# Patient Record
Sex: MALE | Race: OTHER | NOT HISPANIC OR LATINO | Employment: FULL TIME | URBAN - NONMETROPOLITAN AREA
[De-identification: names, ages, dates, MRNs, and addresses within clinical notes are randomized per-mention and may not be internally consistent; named-entity substitution may affect disease eponyms.]

---

## 2022-04-01 ENCOUNTER — LAB VISIT (OUTPATIENT)
Dept: LAB | Facility: HOSPITAL | Age: 47
End: 2022-04-01
Attending: STUDENT IN AN ORGANIZED HEALTH CARE EDUCATION/TRAINING PROGRAM
Payer: MEDICAID

## 2022-04-01 ENCOUNTER — OFFICE VISIT (OUTPATIENT)
Dept: PRIMARY CARE CLINIC | Facility: CLINIC | Age: 47
End: 2022-04-01
Payer: MEDICAID

## 2022-04-01 VITALS
BODY MASS INDEX: 28.76 KG/M2 | HEART RATE: 71 BPM | DIASTOLIC BLOOD PRESSURE: 71 MMHG | SYSTOLIC BLOOD PRESSURE: 119 MMHG | OXYGEN SATURATION: 97 % | TEMPERATURE: 98 F | HEIGHT: 65 IN | WEIGHT: 172.63 LBS

## 2022-04-01 DIAGNOSIS — Z13.0 SCREENING FOR DEFICIENCY ANEMIA: ICD-10-CM

## 2022-04-01 DIAGNOSIS — Z13.220 ENCOUNTER FOR LIPID SCREENING FOR CARDIOVASCULAR DISEASE: ICD-10-CM

## 2022-04-01 DIAGNOSIS — Z13.6 ENCOUNTER FOR LIPID SCREENING FOR CARDIOVASCULAR DISEASE: ICD-10-CM

## 2022-04-01 DIAGNOSIS — H53.8 BLURRED VISION, LEFT EYE: Primary | ICD-10-CM

## 2022-04-01 LAB
ALBUMIN SERPL BCP-MCNC: 4.1 G/DL (ref 3.5–5.2)
ALP SERPL-CCNC: 83 U/L (ref 55–135)
ALT SERPL W/O P-5'-P-CCNC: 67 U/L (ref 10–44)
ANION GAP SERPL CALC-SCNC: 4 MMOL/L (ref 8–16)
AST SERPL-CCNC: 29 U/L (ref 10–40)
BASOPHILS # BLD AUTO: 0.04 K/UL (ref 0–0.2)
BASOPHILS NFR BLD: 0.6 % (ref 0–1.9)
BILIRUB SERPL-MCNC: 0.3 MG/DL (ref 0.1–1)
BUN SERPL-MCNC: 16 MG/DL (ref 6–20)
CALCIUM SERPL-MCNC: 9.1 MG/DL (ref 8.7–10.5)
CHLORIDE SERPL-SCNC: 104 MMOL/L (ref 95–110)
CHOLEST SERPL-MCNC: 287 MG/DL (ref 120–199)
CHOLEST/HDLC SERPL: 7.6 {RATIO} (ref 2–5)
CO2 SERPL-SCNC: 30 MMOL/L (ref 23–29)
CREAT SERPL-MCNC: 1 MG/DL (ref 0.5–1.4)
DIFFERENTIAL METHOD: NORMAL
EOSINOPHIL # BLD AUTO: 0.1 K/UL (ref 0–0.5)
EOSINOPHIL NFR BLD: 1.9 % (ref 0–8)
ERYTHROCYTE [DISTWIDTH] IN BLOOD BY AUTOMATED COUNT: 14.1 % (ref 11.5–14.5)
EST. GFR  (AFRICAN AMERICAN): >60 ML/MIN/1.73 M^2
EST. GFR  (NON AFRICAN AMERICAN): >60 ML/MIN/1.73 M^2
GLUCOSE SERPL-MCNC: 104 MG/DL (ref 70–110)
HCT VFR BLD AUTO: 45.9 % (ref 40–54)
HDLC SERPL-MCNC: 38 MG/DL (ref 40–75)
HDLC SERPL: 13.2 % (ref 20–50)
HGB BLD-MCNC: 15.1 G/DL (ref 14–18)
IMM GRANULOCYTES # BLD AUTO: 0.02 K/UL (ref 0–0.04)
IMM GRANULOCYTES NFR BLD AUTO: 0.3 % (ref 0–0.5)
LDLC SERPL CALC-MCNC: ABNORMAL MG/DL (ref 63–159)
LYMPHOCYTES # BLD AUTO: 2.9 K/UL (ref 1–4.8)
LYMPHOCYTES NFR BLD: 40.7 % (ref 18–48)
MCH RBC QN AUTO: 27.1 PG (ref 27–31)
MCHC RBC AUTO-ENTMCNC: 32.9 G/DL (ref 32–36)
MCV RBC AUTO: 82 FL (ref 82–98)
MONOCYTES # BLD AUTO: 0.6 K/UL (ref 0.3–1)
MONOCYTES NFR BLD: 8.3 % (ref 4–15)
NEUTROPHILS # BLD AUTO: 3.5 K/UL (ref 1.8–7.7)
NEUTROPHILS NFR BLD: 48.2 % (ref 38–73)
NONHDLC SERPL-MCNC: 249 MG/DL
NRBC BLD-RTO: 0 /100 WBC
PLATELET # BLD AUTO: 211 K/UL (ref 150–450)
PMV BLD AUTO: 10.5 FL (ref 9.2–12.9)
POTASSIUM SERPL-SCNC: 3.9 MMOL/L (ref 3.5–5.1)
PROT SERPL-MCNC: 7.8 G/DL (ref 6–8.4)
RBC # BLD AUTO: 5.57 M/UL (ref 4.6–6.2)
SODIUM SERPL-SCNC: 138 MMOL/L (ref 136–145)
TRIGL SERPL-MCNC: 457 MG/DL (ref 30–150)
WBC # BLD AUTO: 7.23 K/UL (ref 3.9–12.7)

## 2022-04-01 PROCEDURE — 3008F PR BODY MASS INDEX (BMI) DOCUMENTED: ICD-10-PCS | Mod: CPTII,,, | Performed by: STUDENT IN AN ORGANIZED HEALTH CARE EDUCATION/TRAINING PROGRAM

## 2022-04-01 PROCEDURE — 36415 COLL VENOUS BLD VENIPUNCTURE: CPT | Performed by: STUDENT IN AN ORGANIZED HEALTH CARE EDUCATION/TRAINING PROGRAM

## 2022-04-01 PROCEDURE — 3008F BODY MASS INDEX DOCD: CPT | Mod: CPTII,,, | Performed by: STUDENT IN AN ORGANIZED HEALTH CARE EDUCATION/TRAINING PROGRAM

## 2022-04-01 PROCEDURE — 3078F DIAST BP <80 MM HG: CPT | Mod: CPTII,,, | Performed by: STUDENT IN AN ORGANIZED HEALTH CARE EDUCATION/TRAINING PROGRAM

## 2022-04-01 PROCEDURE — 99203 OFFICE O/P NEW LOW 30 MIN: CPT | Mod: PBBFAC,PN | Performed by: STUDENT IN AN ORGANIZED HEALTH CARE EDUCATION/TRAINING PROGRAM

## 2022-04-01 PROCEDURE — 1159F PR MEDICATION LIST DOCUMENTED IN MEDICAL RECORD: ICD-10-PCS | Mod: CPTII,,, | Performed by: STUDENT IN AN ORGANIZED HEALTH CARE EDUCATION/TRAINING PROGRAM

## 2022-04-01 PROCEDURE — 99203 PR OFFICE/OUTPT VISIT, NEW, LEVL III, 30-44 MIN: ICD-10-PCS | Mod: S$PBB,,, | Performed by: STUDENT IN AN ORGANIZED HEALTH CARE EDUCATION/TRAINING PROGRAM

## 2022-04-01 PROCEDURE — 80053 COMPREHEN METABOLIC PANEL: CPT | Performed by: STUDENT IN AN ORGANIZED HEALTH CARE EDUCATION/TRAINING PROGRAM

## 2022-04-01 PROCEDURE — 99203 OFFICE O/P NEW LOW 30 MIN: CPT | Mod: S$PBB,,, | Performed by: STUDENT IN AN ORGANIZED HEALTH CARE EDUCATION/TRAINING PROGRAM

## 2022-04-01 PROCEDURE — 3074F PR MOST RECENT SYSTOLIC BLOOD PRESSURE < 130 MM HG: ICD-10-PCS | Mod: CPTII,,, | Performed by: STUDENT IN AN ORGANIZED HEALTH CARE EDUCATION/TRAINING PROGRAM

## 2022-04-01 PROCEDURE — 99999 PR PBB SHADOW E&M-NEW PATIENT-LVL III: CPT | Mod: PBBFAC,,, | Performed by: STUDENT IN AN ORGANIZED HEALTH CARE EDUCATION/TRAINING PROGRAM

## 2022-04-01 PROCEDURE — 99999 PR PBB SHADOW E&M-NEW PATIENT-LVL III: ICD-10-PCS | Mod: PBBFAC,,, | Performed by: STUDENT IN AN ORGANIZED HEALTH CARE EDUCATION/TRAINING PROGRAM

## 2022-04-01 PROCEDURE — 80061 LIPID PANEL: CPT | Performed by: STUDENT IN AN ORGANIZED HEALTH CARE EDUCATION/TRAINING PROGRAM

## 2022-04-01 PROCEDURE — 85025 COMPLETE CBC W/AUTO DIFF WBC: CPT | Performed by: STUDENT IN AN ORGANIZED HEALTH CARE EDUCATION/TRAINING PROGRAM

## 2022-04-01 PROCEDURE — 3078F PR MOST RECENT DIASTOLIC BLOOD PRESSURE < 80 MM HG: ICD-10-PCS | Mod: CPTII,,, | Performed by: STUDENT IN AN ORGANIZED HEALTH CARE EDUCATION/TRAINING PROGRAM

## 2022-04-01 PROCEDURE — 1159F MED LIST DOCD IN RCRD: CPT | Mod: CPTII,,, | Performed by: STUDENT IN AN ORGANIZED HEALTH CARE EDUCATION/TRAINING PROGRAM

## 2022-04-01 PROCEDURE — 3074F SYST BP LT 130 MM HG: CPT | Mod: CPTII,,, | Performed by: STUDENT IN AN ORGANIZED HEALTH CARE EDUCATION/TRAINING PROGRAM

## 2022-04-02 PROBLEM — H53.8 BLURRED VISION, LEFT EYE: Status: ACTIVE | Noted: 2022-04-02

## 2022-04-02 PROBLEM — Z13.6 ENCOUNTER FOR LIPID SCREENING FOR CARDIOVASCULAR DISEASE: Status: ACTIVE | Noted: 2022-04-02

## 2022-04-02 PROBLEM — Z13.220 ENCOUNTER FOR LIPID SCREENING FOR CARDIOVASCULAR DISEASE: Status: ACTIVE | Noted: 2022-04-02

## 2022-04-02 PROBLEM — Z13.0 SCREENING FOR DEFICIENCY ANEMIA: Status: ACTIVE | Noted: 2022-04-02

## 2022-04-02 NOTE — ASSESSMENT & PLAN NOTE
- stay physically active  - maintain daily adequate hydration 1.5 to 2L fluid volume  - as tolerated incorporate resistance training with stretching and cardio  - goal of 150 minutes per week of moderate intensity activity or 7,500 - 10,000 steps per day  - follow the Mediterranean diet, avoid low saturated fat diet  - include whole fresh fruits, vegetables, olive oil, seeds, nuts, whole grains, cold water fish, salmon, mackerel and lean cuts of meat    - do not drink sugary/diet carbonated beverages  - decrease portion sizes slightly which will result in an approximately 500-calorie deficit  - avoid fast or fried and processed food, especially canned foods   - avoid refined carbohydrates, white starchy foods, flour, white potato, bread, muffins, and cakes

## 2022-04-02 NOTE — PROGRESS NOTES
[facility] Note    Chief Complaint      Chief Complaint   Patient presents with    Establish Care    Blurred Vision       History of Present Illness      Ghislaine Arrington is a 46 y.o. male who presents today for Establish Care and Blurred Vision  Native [place]ian speaker, patient presents with interpretor Gertrudis.   Patient has noticed blurred vision affecting the left eye. He reports difficulty adjusting after bright light shines into his eyes. Over the years, he had noticed difficulty reading near objects.   Other associated symptoms include headache which resolves with one tablet Tylenol. Headaches occur once a month.   Denies fever, chills, excessive headaches, dizziness, chest pain, palpitations, shortness of breath, abdominal pain, nausea, vomiting,GI/ complaints.   Patient has been boosted for COVID immunization.   Physical exam with reduced visual acuity, will refer to optometry.     Past Medical History:  History reviewed. No pertinent past medical history.    Past Surgical History:   has no past surgical history on file.    Family History:  family history is not on file.     Social History:  Social History     Tobacco Use    Smoking status: Never Smoker    Smokeless tobacco: Never Used   Substance Use Topics    Alcohol use: Not Currently     Comment: occasionally    Drug use: Never     I personally reviewed all past medical, surgical, social and family history.    Review of Systems   Constitutional: Negative for chills, fever, malaise/fatigue and weight loss.   HENT: Negative for congestion, ear discharge, ear pain, sinus pain and sore throat.    Eyes: Negative for blurred vision, pain, discharge and redness.   Respiratory: Negative for cough, hemoptysis, sputum production, shortness of breath, wheezing and stridor.    Cardiovascular: Negative for chest pain, palpitations and leg swelling.   Gastrointestinal: Negative for abdominal pain, blood in stool, constipation, diarrhea, nausea and  "vomiting.   Genitourinary: Negative for dysuria, frequency and hematuria.   Musculoskeletal: Negative for back pain, falls, joint pain, myalgias and neck pain.   Skin: Negative.  Negative for rash.   Neurological: Negative for dizziness, tingling, focal weakness, seizures, weakness and headaches.   Endo/Heme/Allergies: Negative for environmental allergies and polydipsia. Does not bruise/bleed easily.   Psychiatric/Behavioral: Negative for depression and suicidal ideas. The patient is not nervous/anxious.       Medications:  No outpatient encounter medications on file as of 4/1/2022.     No facility-administered encounter medications on file as of 4/1/2022.     Allergies:  Review of patient's allergies indicates:  No Known Allergies    Health Maintenance:    There is no immunization history on file for this patient.   Health Maintenance   Topic Date Due    Hepatitis C Screening  Never done    TETANUS VACCINE  Never done    Lipid Panel  04/01/2027     Health Maintenance Topics with due status: Not Due       Topic Last Completion Date    Lipid Panel 04/01/2022     Health Maintenance Due   Topic Date Due    Hepatitis C Screening  Never done    COVID-19 Vaccine (1) Never done    HIV Screening  Never done    TETANUS VACCINE  Never done    Influenza Vaccine (1) Never done     Physical Exam      Vital Signs  Temp: 97.5 °F (36.4 °C)  Temp src: Oral  Pulse: 71  SpO2: 97 %  BP: 119/71  BP Location: Right arm  Patient Position: Sitting  Height and Weight  Height: 5' 5" (165.1 cm)  Weight: 78.3 kg (172 lb 9.9 oz)  BSA (Calculated - sq m): 1.89 sq meters  BMI (Calculated): 28.7  Weight in (lb) to have BMI = 25: 149.9]    Physical Exam  Vitals and nursing note reviewed.   Constitutional:       General: He is not in acute distress.     Appearance: Normal appearance. He is not toxic-appearing.   HENT:      Head: Normocephalic and atraumatic.      Right Ear: Tympanic membrane, ear canal and external ear normal.      Left Ear: " Tympanic membrane, ear canal and external ear normal.      Nose: Nose normal.      Mouth/Throat:      Mouth: Mucous membranes are moist.      Pharynx: Oropharynx is clear.   Eyes:      Extraocular Movements: Extraocular movements intact.      Conjunctiva/sclera: Conjunctivae normal.      Pupils: Pupils are equal, round, and reactive to light.   Cardiovascular:      Rate and Rhythm: Normal rate and regular rhythm.      Pulses: Normal pulses.      Heart sounds: Normal heart sounds. No murmur heard.  Pulmonary:      Effort: Pulmonary effort is normal. No respiratory distress.      Breath sounds: Normal breath sounds. No wheezing or rales.   Abdominal:      General: Abdomen is flat. Bowel sounds are normal. There is no distension.      Palpations: Abdomen is soft. There is no mass.      Tenderness: There is no abdominal tenderness. There is no right CVA tenderness or left CVA tenderness.   Musculoskeletal:         General: Normal range of motion.      Cervical back: Normal range of motion and neck supple. No rigidity.   Skin:     General: Skin is warm and dry.      Capillary Refill: Capillary refill takes less than 2 seconds.   Neurological:      General: No focal deficit present.      Mental Status: He is alert and oriented to person, place, and time.      Cranial Nerves: No cranial nerve deficit.      Sensory: No sensory deficit.      Motor: No weakness.      Coordination: Coordination normal.      Gait: Gait normal.   Psychiatric:         Mood and Affect: Mood normal.         Behavior: Behavior normal.         Thought Content: Thought content normal.         Judgment: Judgment normal.        Assessment/Plan     Ghislaine Arrington is a 46 y.o.male with:    Problem List Items Addressed This Visit        Ophtho    Blurred vision, left eye - Primary    Relevant Orders    Ambulatory referral/consult to Optometry       Cardiac/Vascular    Encounter for lipid screening for cardiovascular disease    Relevant Orders    CBC Auto  Differential (Completed)    Lipid Panel (Completed)    Comprehensive Metabolic Panel (Completed)       Oncology    Screening for deficiency anemia    Relevant Orders    CBC Auto Differential (Completed)    Comprehensive Metabolic Panel (Completed)       Endocrine    BMI 28.0-28.9,adult    Current Assessment & Plan     - stay physically active  - maintain daily adequate hydration 1.5 to 2L fluid volume  - as tolerated incorporate resistance training with stretching and cardio  - goal of 150 minutes per week of moderate intensity activity or 7,500 - 10,000 steps per day  - follow the Mediterranean diet, avoid low saturated fat diet  - include whole fresh fruits, vegetables, olive oil, seeds, nuts, whole grains, cold water fish, salmon, mackerel and lean cuts of meat    - do not drink sugary/diet carbonated beverages  - decrease portion sizes slightly which will result in an approximately 500-calorie deficit  - avoid fast or fried and processed food, especially canned foods   - avoid refined carbohydrates, white starchy foods, flour, white potato, bread, muffins, and cakes                 Other than changes above, cont current medications and maintain follow up with specialists.  Follow up in about 1 week (around 4/8/2022).    Future Appointments   Date Time Provider Department Center   4/8/2022  2:30 PM Cyndee Torrez DO [facility]       Kazumi G Yoshinaga, DO [facility]

## 2022-04-08 ENCOUNTER — OFFICE VISIT (OUTPATIENT)
Dept: PRIMARY CARE CLINIC | Facility: CLINIC | Age: 47
End: 2022-04-08
Payer: MEDICAID

## 2022-04-08 VITALS
OXYGEN SATURATION: 94 % | HEART RATE: 95 BPM | SYSTOLIC BLOOD PRESSURE: 120 MMHG | BODY MASS INDEX: 29.2 KG/M2 | TEMPERATURE: 98 F | HEIGHT: 65 IN | DIASTOLIC BLOOD PRESSURE: 73 MMHG | WEIGHT: 175.25 LBS

## 2022-04-08 DIAGNOSIS — E78.1 HYPERTRIGLYCERIDEMIA: ICD-10-CM

## 2022-04-08 DIAGNOSIS — Z13.0 SCREENING FOR DEFICIENCY ANEMIA: ICD-10-CM

## 2022-04-08 DIAGNOSIS — E78.2 MIXED HYPERTRIGLYCERIDEMIA: Primary | ICD-10-CM

## 2022-04-08 DIAGNOSIS — R79.89 ELEVATED LFTS: ICD-10-CM

## 2022-04-08 PROCEDURE — 3008F PR BODY MASS INDEX (BMI) DOCUMENTED: ICD-10-PCS | Mod: CPTII,,, | Performed by: STUDENT IN AN ORGANIZED HEALTH CARE EDUCATION/TRAINING PROGRAM

## 2022-04-08 PROCEDURE — 3078F DIAST BP <80 MM HG: CPT | Mod: CPTII,,, | Performed by: STUDENT IN AN ORGANIZED HEALTH CARE EDUCATION/TRAINING PROGRAM

## 2022-04-08 PROCEDURE — 3074F SYST BP LT 130 MM HG: CPT | Mod: CPTII,,, | Performed by: STUDENT IN AN ORGANIZED HEALTH CARE EDUCATION/TRAINING PROGRAM

## 2022-04-08 PROCEDURE — 99213 OFFICE O/P EST LOW 20 MIN: CPT | Mod: S$PBB,,, | Performed by: STUDENT IN AN ORGANIZED HEALTH CARE EDUCATION/TRAINING PROGRAM

## 2022-04-08 PROCEDURE — 3008F BODY MASS INDEX DOCD: CPT | Mod: CPTII,,, | Performed by: STUDENT IN AN ORGANIZED HEALTH CARE EDUCATION/TRAINING PROGRAM

## 2022-04-08 PROCEDURE — 99999 PR PBB SHADOW E&M-EST. PATIENT-LVL III: CPT | Mod: PBBFAC,,, | Performed by: STUDENT IN AN ORGANIZED HEALTH CARE EDUCATION/TRAINING PROGRAM

## 2022-04-08 PROCEDURE — 3078F PR MOST RECENT DIASTOLIC BLOOD PRESSURE < 80 MM HG: ICD-10-PCS | Mod: CPTII,,, | Performed by: STUDENT IN AN ORGANIZED HEALTH CARE EDUCATION/TRAINING PROGRAM

## 2022-04-08 PROCEDURE — 1159F MED LIST DOCD IN RCRD: CPT | Mod: CPTII,,, | Performed by: STUDENT IN AN ORGANIZED HEALTH CARE EDUCATION/TRAINING PROGRAM

## 2022-04-08 PROCEDURE — 99213 PR OFFICE/OUTPT VISIT, EST, LEVL III, 20-29 MIN: ICD-10-PCS | Mod: S$PBB,,, | Performed by: STUDENT IN AN ORGANIZED HEALTH CARE EDUCATION/TRAINING PROGRAM

## 2022-04-08 PROCEDURE — 1159F PR MEDICATION LIST DOCUMENTED IN MEDICAL RECORD: ICD-10-PCS | Mod: CPTII,,, | Performed by: STUDENT IN AN ORGANIZED HEALTH CARE EDUCATION/TRAINING PROGRAM

## 2022-04-08 PROCEDURE — 99213 OFFICE O/P EST LOW 20 MIN: CPT | Mod: PBBFAC,PN | Performed by: STUDENT IN AN ORGANIZED HEALTH CARE EDUCATION/TRAINING PROGRAM

## 2022-04-08 PROCEDURE — 3074F PR MOST RECENT SYSTOLIC BLOOD PRESSURE < 130 MM HG: ICD-10-PCS | Mod: CPTII,,, | Performed by: STUDENT IN AN ORGANIZED HEALTH CARE EDUCATION/TRAINING PROGRAM

## 2022-04-08 PROCEDURE — 99999 PR PBB SHADOW E&M-EST. PATIENT-LVL III: ICD-10-PCS | Mod: PBBFAC,,, | Performed by: STUDENT IN AN ORGANIZED HEALTH CARE EDUCATION/TRAINING PROGRAM

## 2022-04-08 NOTE — ASSESSMENT & PLAN NOTE
Lab Results   Component Value Date    WBC 7.23 04/01/2022    HGB 15.1 04/01/2022    HCT 45.9 04/01/2022    MCV 82 04/01/2022     04/01/2022     No anemia, advised on diet for prevention of anemia with balanced diet micronutrients including iron, b12 and folic acid.

## 2022-04-08 NOTE — ASSESSMENT & PLAN NOTE
"- Your LDL "bad cholesterol" is not calculated due to high triglycerides  - Your HDL "good cholesterol" is low at 38  - Your Triglycerides, natural fats and oils in blood is elevated at 457  - To help improve your cardiovascular health and reduce your risk of stroke or heart attack, the following healthy lifestyle changes are recommended.   - Choose whole food items, fresh/frozen fruits and vegetables and unprocessed meats.  - Avoid processed food items, cold cuts, canned foods in sugary and high sodium preservatives.   - Reduce intake of highly refined carbohydrates or white starchy foods, white bread, white flour pasta, sugary cereals, sugary drinks, sweet tea, soda including diet, candies, cakes and donuts.    - Reduce or avoid saturated fats (fats from animals and processed oils like palm oil, peanut oil, vegetable oil) and fried food items.   - Increase healthy fats like omega-3 fish oil, fatty fish (salmon, mackerel, sardine etc), olive oil, avocado, raw nuts etc.   - Increase fiber intake with daily goal of 6-8 servings of vegetables.  - Remember to maintain daily adequate hydration with water 1.5 to 2 liters fluid volume.    - Daily physical activities, start slow with 10-15 minutes of walk daily, then increase as tolerated to twice daily.   - Cardiovascular exercise also improves your cholesterol levels and your goal is low intensity (like walking and biking) 150 min/week to moderate/high intensity 75 min/week.    "

## 2022-04-08 NOTE — ASSESSMENT & PLAN NOTE
Wt Readings from Last 3 Encounters:   04/08/22 1448 79.5 kg (175 lb 4.3 oz)   04/01/22 1513 78.3 kg (172 lb 9.9 oz)     - stay physically active  - maintain daily adequate hydration 1.5 to 2L fluid volume  - as tolerated incorporate resistance training with stretching and cardio  - goal of 150 minutes per week of moderate intensity activity or 7,500 - 10,000 steps per day  - follow the Mediterranean diet, avoid low saturated fat diet  - include whole fresh fruits, vegetables, olive oil, seeds, nuts, whole grains, cold water fish, salmon, mackerel and lean cuts of meat    - do not drink sugary/diet carbonated beverages  - decrease portion sizes slightly which will result in an approximately 500-calorie deficit  - avoid fast or fried and processed food, especially canned foods   - avoid refined carbohydrates, white starchy foods, flour, white potato, bread, muffins, and cakes  - follow a healthy diet that include enough calcium, vitamin D and proteins for bone health

## 2022-04-08 NOTE — PROGRESS NOTES
[facility] Note    Chief Complaint      Chief Complaint   Patient presents with    Follow-up       History of Present Illness      Ghislaine Arrington is a 46 y.o. male who presents today for Follow-up     Lab work significant for elevated ALT 67, lipid profile with elevated triglycerides 457, low HDL of 38.     Reviewed patient's current diet, states white rice is main with some form of meat, including eggs and vegetables. He states when he eats french fries or fried chicken he feels sick, so he avoids eating anything fried.   Patient is a non smoker and does not drink alcohol.  Past medical history significant for liver fluke infection in his twenties in which he became acutely sick with fever, chills, nausea and vomiting. He was treated and felt better after three days. Denies developing jaundice.     Overall he feels like he is in good health.   Given past history of parasitic infection, will obtain right upper quadrant ultrasound and screen for hepatitis.   Denies fever, chills, headaches, palpitations, chest pain, shortness of breath, abdominal pain, nausea, vomiting, blood in urine or stool, diarrhea, constipation,     Past Medical History:  History reviewed. No pertinent past medical history.    Past Surgical History:   has no past surgical history on file.    Family History:  family history is not on file.     Social History:  Social History     Tobacco Use    Smoking status: Never Smoker    Smokeless tobacco: Never Used   Substance Use Topics    Alcohol use: Not Currently     Comment: occasionally    Drug use: Never     I personally reviewed all past medical, surgical, social and family history.    Review of Systems   Constitutional: Negative for chills, fever, malaise/fatigue and weight loss.   HENT: Negative for congestion, ear discharge, ear pain, sinus pain and sore throat.    Eyes: Negative for blurred vision, pain, discharge and redness.   Respiratory: Negative for cough, hemoptysis,  "sputum production, shortness of breath, wheezing and stridor.    Cardiovascular: Negative for chest pain, palpitations and leg swelling.   Gastrointestinal: Negative for abdominal pain, blood in stool, constipation, diarrhea, nausea and vomiting.   Genitourinary: Negative for dysuria, frequency and hematuria.   Musculoskeletal: Negative for back pain, falls, joint pain, myalgias and neck pain.   Skin: Negative.  Negative for rash.   Neurological: Negative for dizziness, tingling, focal weakness, seizures, weakness and headaches.   Endo/Heme/Allergies: Negative for environmental allergies and polydipsia. Does not bruise/bleed easily.   Psychiatric/Behavioral: Negative for depression and suicidal ideas. The patient is not nervous/anxious.       Medications:  No outpatient encounter medications on file as of 4/8/2022.     No facility-administered encounter medications on file as of 4/8/2022.     Allergies:  Review of patient's allergies indicates:  No Known Allergies    Health Maintenance:    There is no immunization history on file for this patient.   Health Maintenance   Topic Date Due    Hepatitis C Screening  Never done    TETANUS VACCINE  Never done    Lipid Panel  04/01/2027      Physical Exam      Vital Signs  Temp: 97.7 °F (36.5 °C)  Temp src: Oral  Pulse: 95  SpO2: (!) 94 %  BP: 120/73  BP Location: Right arm  Patient Position: Sitting  Height and Weight  Height: 5' 5" (165.1 cm)  Weight: 79.5 kg (175 lb 4.3 oz)  BSA (Calculated - sq m): 1.91 sq meters  BMI (Calculated): 29.2  Weight in (lb) to have BMI = 25: 149.9]    Physical Exam  Vitals and nursing note reviewed.   Constitutional:       General: He is not in acute distress.     Appearance: Normal appearance. He is not toxic-appearing.   HENT:      Head: Normocephalic and atraumatic.      Right Ear: Tympanic membrane, ear canal and external ear normal.      Left Ear: Tympanic membrane, ear canal and external ear normal.      Nose: Nose normal.      " Mouth/Throat:      Mouth: Mucous membranes are moist.      Pharynx: Oropharynx is clear.   Eyes:      Extraocular Movements: Extraocular movements intact.      Conjunctiva/sclera: Conjunctivae normal.      Pupils: Pupils are equal, round, and reactive to light.   Cardiovascular:      Rate and Rhythm: Normal rate and regular rhythm.      Pulses: Normal pulses.      Heart sounds: Normal heart sounds. No murmur heard.  Pulmonary:      Effort: Pulmonary effort is normal. No respiratory distress.      Breath sounds: Normal breath sounds. No wheezing or rales.   Abdominal:      General: Abdomen is flat. Bowel sounds are normal. There is no distension.      Palpations: Abdomen is soft. There is no mass.      Tenderness: There is no abdominal tenderness. There is no right CVA tenderness, left CVA tenderness or guarding.      Comments: Negative Marie's   Musculoskeletal:         General: Normal range of motion.      Cervical back: Normal range of motion and neck supple. No rigidity.      Right lower leg: No edema.      Left lower leg: No edema.   Skin:     General: Skin is warm and dry.      Capillary Refill: Capillary refill takes less than 2 seconds.   Neurological:      General: No focal deficit present.      Mental Status: He is alert and oriented to person, place, and time.      Cranial Nerves: No cranial nerve deficit.      Sensory: No sensory deficit.      Motor: No weakness.      Coordination: Coordination normal.      Gait: Gait normal.   Psychiatric:         Mood and Affect: Mood normal.         Behavior: Behavior normal.         Thought Content: Thought content normal.         Judgment: Judgment normal.        Laboratory:  CBC:  Recent Labs   Lab 04/01/22  1717   WBC 7.23   RBC 5.57   Hemoglobin 15.1   Hematocrit 45.9   Platelets 211   MCV 82   MCH 27.1   MCHC 32.9     CMP:  Recent Labs   Lab 04/01/22  1717   Glucose 104   Calcium 9.1   Albumin 4.1   Total Protein 7.8   Sodium 138   Potassium 3.9   CO2 30 H  "  Chloride 104   BUN 16   Alkaline Phosphatase 83   ALT 67 H   AST 29   Total Bilirubin 0.3          LIPIDS:  Recent Labs   Lab 04/01/22  1717   HDL 38 L   Cholesterol 287 H   Triglycerides 457 H   LDL Cholesterol Invalid, Trig>400.0   HDL/Cholesterol Ratio 13.2 L   Non-HDL Cholesterol 249   Total Cholesterol/HDL Ratio 7.6 H     Assessment/Plan     Nha Murphy is a 46 y.o.male with:    Problem List Items Addressed This Visit        Cardiac/Vascular    Mixed hypertriglyceridemia - Primary    Current Assessment & Plan     - Your LDL "bad cholesterol" is not calculated due to high triglycerides  - Your HDL "good cholesterol" is low at 38  - Your Triglycerides, natural fats and oils in blood is elevated at 457  - To help improve your cardiovascular health and reduce your risk of stroke or heart attack, the following healthy lifestyle changes are recommended.   - Choose whole food items, fresh/frozen fruits and vegetables and unprocessed meats.  - Avoid processed food items, cold cuts, canned foods in sugary and high sodium preservatives.   - Reduce intake of highly refined carbohydrates or white starchy foods, white bread, white flour pasta, sugary cereals, sugary drinks, sweet tea, soda including diet, candies, cakes and donuts.    - Reduce or avoid saturated fats (fats from animals and processed oils like palm oil, peanut oil, vegetable oil) and fried food items.   - Increase healthy fats like omega-3 fish oil, fatty fish (salmon, mackerel, sardine etc), olive oil, avocado, raw nuts etc.   - Increase fiber intake with daily goal of 6-8 servings of vegetables.  - Remember to maintain daily adequate hydration with water 1.5 to 2 liters fluid volume.    - Daily physical activities, start slow with 10-15 minutes of walk daily, then increase as tolerated to twice daily.   - Cardiovascular exercise also improves your cholesterol levels and your goal is low intensity (like walking and biking) 150 min/week to moderate/high " intensity 75 min/week.             Relevant Orders    US Abdomen Limited       Oncology    Screening for deficiency anemia    Current Assessment & Plan     Lab Results   Component Value Date    WBC 7.23 04/01/2022    HGB 15.1 04/01/2022    HCT 45.9 04/01/2022    MCV 82 04/01/2022     04/01/2022     No anemia, advised on diet for prevention of anemia with balanced diet micronutrients including iron, b12 and folic acid.                   Endocrine    BMI 28.0-28.9,adult    Current Assessment & Plan     Wt Readings from Last 3 Encounters:   04/08/22 1448 79.5 kg (175 lb 4.3 oz)   04/01/22 1513 78.3 kg (172 lb 9.9 oz)     - stay physically active  - maintain daily adequate hydration 1.5 to 2L fluid volume  - as tolerated incorporate resistance training with stretching and cardio  - goal of 150 minutes per week of moderate intensity activity or 7,500 - 10,000 steps per day  - follow the Mediterranean diet, avoid low saturated fat diet  - include whole fresh fruits, vegetables, olive oil, seeds, nuts, whole grains, cold water fish, salmon, mackerel and lean cuts of meat    - do not drink sugary/diet carbonated beverages  - decrease portion sizes slightly which will result in an approximately 500-calorie deficit  - avoid fast or fried and processed food, especially canned foods   - avoid refined carbohydrates, white starchy foods, flour, white potato, bread, muffins, and cakes  - follow a healthy diet that include enough calcium, vitamin D and proteins for bone health              GI    Elevated LFTs    Relevant Orders    Hepatitis B Surface Antigen    Hepatitis B Core Antibody, Total    US Abdomen Limited      Other Visit Diagnoses     Hypertriglyceridemia            Other than changes above, cont current medications and maintain follow up with specialists.  No follow-ups on file.    No future appointments.    Kazumi G Yoshinaga, DO [facility]

## 2022-04-12 ENCOUNTER — TELEPHONE (OUTPATIENT)
Dept: PRIMARY CARE CLINIC | Facility: CLINIC | Age: 47
End: 2022-04-12
Payer: MEDICAID

## 2022-04-21 ENCOUNTER — HOSPITAL ENCOUNTER (OUTPATIENT)
Dept: RADIOLOGY | Facility: HOSPITAL | Age: 47
Discharge: HOME | End: 2022-04-21
Attending: STUDENT IN AN ORGANIZED HEALTH CARE EDUCATION/TRAINING PROGRAM
Payer: MEDICAID

## 2022-04-21 DIAGNOSIS — E78.2 MIXED HYPERTRIGLYCERIDEMIA: ICD-10-CM

## 2022-04-21 DIAGNOSIS — R79.89 ELEVATED LFTS: ICD-10-CM

## 2022-04-21 PROCEDURE — 76705 ECHO EXAM OF ABDOMEN: CPT | Mod: TC

## 2022-07-04 PROBLEM — Z13.0 SCREENING FOR DEFICIENCY ANEMIA: Status: RESOLVED | Noted: 2022-04-02 | Resolved: 2022-07-04

## 2023-03-01 ENCOUNTER — PATIENT OUTREACH (OUTPATIENT)
Dept: ADMINISTRATIVE | Facility: HOSPITAL | Age: 48
End: 2023-03-01
Payer: MEDICAID

## 2023-03-01 NOTE — PROGRESS NOTES
With the help of language line [id number] Obed servin to l/m no v/m  For office visit and colorectal cancer screen

## 2023-10-06 ENCOUNTER — PATIENT OUTREACH (OUTPATIENT)
Dept: ADMINISTRATIVE | Facility: HOSPITAL | Age: 48
End: 2023-10-06
Payer: MEDICAID

## 2023-10-25 ENCOUNTER — PATIENT OUTREACH (OUTPATIENT)
Dept: ADMINISTRATIVE | Facility: HOSPITAL | Age: 48
End: 2023-10-25
Payer: MEDICAID